# Patient Record
Sex: FEMALE | Race: WHITE | Employment: FULL TIME | URBAN - METROPOLITAN AREA
[De-identification: names, ages, dates, MRNs, and addresses within clinical notes are randomized per-mention and may not be internally consistent; named-entity substitution may affect disease eponyms.]

---

## 2017-02-07 ENCOUNTER — OFFICE VISIT (OUTPATIENT)
Dept: INTERNAL MEDICINE CLINIC | Age: 47
End: 2017-02-07

## 2017-02-07 VITALS
BODY MASS INDEX: 31.18 KG/M2 | WEIGHT: 194 LBS | HEART RATE: 68 BPM | RESPIRATION RATE: 16 BRPM | DIASTOLIC BLOOD PRESSURE: 88 MMHG | SYSTOLIC BLOOD PRESSURE: 142 MMHG | TEMPERATURE: 98.6 F | HEIGHT: 66 IN | OXYGEN SATURATION: 98 %

## 2017-02-07 DIAGNOSIS — Z98.890 S/P GASTRIC SURGERY: ICD-10-CM

## 2017-02-07 DIAGNOSIS — F10.11 HISTORY OF ALCOHOL ABUSE: ICD-10-CM

## 2017-02-07 DIAGNOSIS — F41.8 DEPRESSION WITH ANXIETY: ICD-10-CM

## 2017-02-07 DIAGNOSIS — E66.9 OBESITY (BMI 30.0-34.9): ICD-10-CM

## 2017-02-07 DIAGNOSIS — E03.9 ACQUIRED HYPOTHYROIDISM: Primary | ICD-10-CM

## 2017-02-07 NOTE — PROGRESS NOTES
Written by Nargis Borja, as dictated by Dr. Erich Greenfield MD.    Emily Azul is a 48 y.o. female. HPI  The patient comes in today to establish care. She has a Hx of depression. She has tried Wellbutrin and Effexor in the past. She is a recovering alcoholic, and she would like to follow with a counselor. Her BP is high today at 142/88 and she has been stressed. She works at [employer]. She is only taking levothyroxine and multivitamin. She denies any FMHx of diabetes. She does have cramping in her legs and seasonal allergies. She had a gastric bypass surgery 11 years ago and she was 300 lbs and she is now 194 lbs. She has not been taking B12. She was 175 lbs after the birth of her child, but she has gained her weight since her  went back to school and she has to work full time. She has not followed with an ob/gyn since she had been here. She has a 10 year IUD and she has had it for 6 years. She has not had a cycle with her IUD. She is concerned because she has a discharge with a slight odor, but she does not have any itching or burning. Patient Active Problem List   Diagnosis Code    Obesity (BMI 30.0-34. 9) E66.9    Depression F32.9    History of alcohol abuse Z87.898    S/P gastric surgery Z98.890        Current Outpatient Prescriptions on File Prior to Visit   Medication Sig Dispense Refill    levothyroxine (LEVOTHROID) 75 mcg tablet Take  by mouth Daily (before breakfast).  cholecalciferol (VITAMIN D3) 1,000 unit tablet Take  by mouth daily.  multivitamin, tx-iron-ca-min (THERA-M W/ IRON) 9 mg iron-400 mcg tab tablet Take 1 Tab by mouth daily.  docusate sodium (STOOL SOFTENER) 100 mg capsule Take 100 mg by mouth two (2) times a day. No current facility-administered medications on file prior to visit.         No Known Allergies    Past Medical History   Diagnosis Date    Alcohol abuse     Anemia     Depression  Hyperthyroidism 1/1/2012    Joint pain 1/1/2014    Stress     TIA (transient ischemic attack) 1978       Past Surgical History   Procedure Laterality Date    Hx gastric bypass  2001    Hx cholecystectomy         Family History   Problem Relation Age of Onset    Cancer Father     Hypertension Father     Diabetes Brother     Psychiatric Disorder Maternal Grandmother     Psychiatric Disorder Paternal Grandmother     Diabetes Paternal Grandfather        Social History     Social History    Marital status:      Spouse name: N/A    Number of children: N/A    Years of education: N/A     Occupational History    Not on file. Social History Main Topics    Smoking status: Never Smoker    Smokeless tobacco: Never Used    Alcohol use 0.0 oz/week     0 Standard drinks or equivalent per week    Drug use: No    Sexual activity: Yes     Partners: Male     Other Topics Concern    Not on file     Social History Narrative           Review of Systems   Constitutional: Negative for malaise/fatigue. HENT: Negative for congestion. Eyes: Negative for blurred vision and discharge. Respiratory: Negative for cough and wheezing. Cardiovascular: Negative for chest pain and palpitations. Gastrointestinal: Negative for abdominal pain and heartburn. Genitourinary: Negative for frequency and urgency. Musculoskeletal: Negative for joint pain and myalgias. Neurological: Negative for dizziness, tingling, sensory change, weakness and headaches. Psychiatric/Behavioral: Negative for depression and suicidal ideas. The patient is not nervous/anxious. Visit Vitals    /88 (BP 1 Location: Left arm, BP Patient Position: Sitting)    Pulse 68    Temp 98.6 °F (37 °C) (Oral)    Resp 16    Ht 5' 6\" (1.676 m)    Wt 194 lb (88 kg)    SpO2 98%    BMI 31.31 kg/m2     Physical Exam   Constitutional: She is oriented to person, place, and time. No distress.    Obese     HENT:   Right Ear: External ear normal.   Left Ear: External ear normal.   Mouth/Throat: Oropharynx is clear and moist.   Eyes: Conjunctivae and EOM are normal. Right eye exhibits no discharge. Left eye exhibits no discharge. Neck: Normal range of motion. Neck supple. Cardiovascular: Normal rate and regular rhythm. Pulmonary/Chest: Effort normal and breath sounds normal. She has no wheezes. Abdominal: Soft. Bowel sounds are normal. She exhibits no distension. Musculoskeletal:   BL knee crepitus    Lymphadenopathy:     She has no cervical adenopathy. Neurological: She is alert and oriented to person, place, and time. Reflex Scores:       Patellar reflexes are 2+ on the right side and 2+ on the left side. Skin: Skin is intact. Psychiatric: She has a normal mood and affect. Nursing note and vitals reviewed. ASSESSMENT and PLAN    ICD-10-CM ICD-9-CM    1. Acquired hypothyroidism E03.9 244.9 TSH REFLEX TO T4      CBC W/O DIFF      METABOLIC PANEL, COMPREHENSIVE      LIPID PANEL    We will check her levels today. 2. Depression with anxiety F41.8 300.4 naltrexone-buPROPion (CONTRAVE) 8-90 mg TbER ER tablet script given to patient. 3. History of alcohol abuse Z87.898 305.03   I want her to go straight on Contrave to help maintain her weight loss and since it has naltrexone then it can help with her Hx of alcohol abuse and depression. 4. S/P gastric surgery Z98.890 V45.89 VITAMIN B12 & FOLATE      VITAMIN D, 25 HYDROXY    I discussed that her B12 levels are probably low, so we will check it today. 5. Obesity (BMI 30.0-34. 9) E66.9 278.00 I discussed that the Contrave will help her maintain weight loss. This plan was reviewed with the patient and patient agrees. All questions were answered. This scribe documentation was reviewed by me and accurately reflects the examination and decisions made by me. This note will not be viewable in [address]

## 2017-02-07 NOTE — PROGRESS NOTES
Chief Complaint   Patient presents with    New Patient     labs and complete physical if possible, states that she has an iud and has chronic yeast infection.

## 2017-02-07 NOTE — MR AVS SNAPSHOT
Visit Information Date & Time Provider Department Dept. Phone Encounter #  
 2/7/2017 10:00 AM Magdaleno Killian, [address] [facility],[address] Internal Medicine [phone] [id number] Upcoming Health Maintenance Date Due  
 PAP AKA CERVICAL CYTOLOGY 10/2/1989 DTaP/Tdap/Td series (1 - Tdap) 1/2/2013 INFLUENZA AGE 9 TO ADULT 8/1/2016 Allergies as of 2/7/2017  Review Complete On: 2/7/2017 By: Magdaleno Killian MD  
 No Known Allergies Current Immunizations  Never Reviewed Name Date Td 1/1/2013 Not reviewed this visit You Were Diagnosed With   
  
 Codes Comments Acquired hypothyroidism    -  Primary ICD-10-CM: E03.9 ICD-9-CM: 244.9 Depression with anxiety     ICD-10-CM: F41.8 ICD-9-CM: 300.4 History of alcohol abuse     ICD-10-CM: Z87.898 ICD-9-CM: 305.03 S/P gastric surgery     ICD-10-CM: H48.779 ICD-9-CM: V45.89 Obesity (BMI 30.0-34.9)     ICD-10-CM: W14.3 ICD-9-CM: 278.00 Vitals BP Pulse Temp Resp Height(growth percentile) Weight(growth percentile) 142/88 (BP 1 Location: Left arm, BP Patient Position: Sitting) 68 98.6 °F (37 °C) (Oral) 16 5' 6\" (1.676 m) 194 lb (88 kg) SpO2 BMI OB Status Smoking Status 98% 31.31 kg/m2 IUD Never Smoker BMI and BSA Data Body Mass Index Body Surface Area  
 31.31 kg/m 2 2.02 m 2 Preferred Pharmacy Pharmacy Name Phone [address] [place] [address], [facility] [phone] Your Updated Medication List  
  
   
This list is accurate as of: 2/7/17 11:06 AM.  Always use your most recent med list.  
  
  
  
  
 LEVOTHROID 75 mcg tablet Generic drug:  levothyroxine Take  by mouth Daily (before breakfast). multivitamin, tx-iron-ca-min 9 mg iron-400 mcg Tab tablet Commonly known as:  THERA-M w/ IRON Take 1 Tab by mouth daily. naltrexone-buPROPion 8-90 mg Tber ER tablet Commonly known as:  Sheryl Bradley Week 1 1 tab PO QAM, Week 2 1QAM 1QHS, Week 3 2QAM 1 QHS, Week 4 & beyond 2QAM 2QHS STOOL SOFTENER 100 mg capsule Generic drug:  docusate sodium Take 100 mg by mouth two (2) times a day. VITAMIN D3 1,000 unit tablet Generic drug:  cholecalciferol Take  by mouth daily. Prescriptions Printed Refills  
 naltrexone-buPROPion (CONTRAVE) 8-90 mg TbER ER tablet 0 Sig: Week 1 1 tab PO QAM, Week 2 1QAM 1QHS, Week 3 2QAM 1 QHS, Week 4 & beyond 2QAM 2QHS Class: Print We Performed the Following CBC W/O DIFF [63148 CPT(R)] LIPID PANEL [84175 CPT(R)] METABOLIC PANEL, COMPREHENSIVE [10925 CPT(R)] TSH REFLEX TO T4 [[id number] Custom] VITAMIN B12 & FOLATE [23975 CPT(R)] VITAMIN D, 25 HYDROXY [id number] CPT(R)] Introducing [facility]! Hong Dhaliwal introduces Superprotonic patient portal. Now you can access parts of your medical record, email your doctor's office, and request medication refills online. 1. In your internet browser, go to [email or web address] 2. Click on the First Time User? Click Here link in the Sign In box. You will see the New Member Sign Up page. 3. Enter your Superprotonic Access Code exactly as it appears below. You will not need to use this code after youve completed the sign-up process. If you do not sign up before the expiration date, you must request a new code. · Superprotonic Access Code: [email or web address] Expires: 5/8/2017 11:00 AM 
 
4. Enter the last four digits of your Social Security Number (xxxx) and Date of Birth (mm/dd/yyyy) as indicated and click Submit. You will be taken to the next sign-up page. 5. Create a Reunifyt ID. This will be your Superprotonic login ID and cannot be changed, so think of one that is secure and easy to remember. 6. Create a Superprotonic password. You can change your password at any time. 7. Enter your Password Reset Question and Answer.  This can be used at a later time if you forget your password. 8. Enter your e-mail address. You will receive e-mail notification when new information is available in [address] 9. Click Sign Up. You can now view and download portions of your medical record. 10. Click the Download Summary menu link to download a portable copy of your medical information. If you have questions, please visit the Frequently Asked Questions section of the GameTube website. Remember, GameTube is NOT to be used for urgent needs. For medical emergencies, dial 911. Now available from your iPhone and Android! Please provide this summary of care documentation to your next provider. Your primary care clinician is listed as Ponce Luna. If you have any questions after today's visit, please call [phone].

## 2017-02-08 LAB
25(OH)D3+25(OH)D2 SERPL-MCNC: 22.3 NG/ML (ref 30–100)
ALBUMIN SERPL-MCNC: 4.4 G/DL (ref 3.5–5.5)
ALBUMIN/GLOB SERPL: 1.6 {RATIO} (ref 1.1–2.5)
ALP SERPL-CCNC: 59 IU/L (ref 39–117)
ALT SERPL-CCNC: 18 IU/L (ref 0–32)
AST SERPL-CCNC: 26 IU/L (ref 0–40)
BILIRUB SERPL-MCNC: 0.3 MG/DL (ref 0–1.2)
BUN SERPL-MCNC: 8 MG/DL (ref 6–24)
BUN/CREAT SERPL: 12 (ref 9–23)
CALCIUM SERPL-MCNC: 9.2 MG/DL (ref 8.7–10.2)
CHLORIDE SERPL-SCNC: 95 MMOL/L (ref 96–106)
CHOLEST SERPL-MCNC: 141 MG/DL (ref 100–199)
CO2 SERPL-SCNC: 26 MMOL/L (ref 18–29)
CREAT SERPL-MCNC: 0.69 MG/DL (ref 0.57–1)
ERYTHROCYTE [DISTWIDTH] IN BLOOD BY AUTOMATED COUNT: 12.8 % (ref 12.3–15.4)
FOLATE SERPL-MCNC: 11.7 NG/ML
GLOBULIN SER CALC-MCNC: 2.7 G/DL (ref 1.5–4.5)
GLUCOSE SERPL-MCNC: 84 MG/DL (ref 65–99)
HCT VFR BLD AUTO: 38 % (ref 34–46.6)
HDLC SERPL-MCNC: 64 MG/DL
HGB BLD-MCNC: 12.9 G/DL (ref 11.1–15.9)
INTERPRETATION, 910389: NORMAL
LDLC SERPL CALC-MCNC: 66 MG/DL (ref 0–99)
MCH RBC QN AUTO: 29.9 PG (ref 26.6–33)
MCHC RBC AUTO-ENTMCNC: 33.9 G/DL (ref 31.5–35.7)
MCV RBC AUTO: 88 FL (ref 79–97)
PLATELET # BLD AUTO: 237 X10E3/UL (ref 150–379)
POTASSIUM SERPL-SCNC: 3.9 MMOL/L (ref 3.5–5.2)
PROT SERPL-MCNC: 7.1 G/DL (ref 6–8.5)
RBC # BLD AUTO: 4.32 X10E6/UL (ref 3.77–5.28)
SODIUM SERPL-SCNC: 137 MMOL/L (ref 134–144)
TRIGL SERPL-MCNC: 55 MG/DL (ref 0–149)
TSH SERPL DL<=0.005 MIU/L-ACNC: 4.42 UIU/ML (ref 0.45–4.5)
VIT B12 SERPL-MCNC: 337 PG/ML (ref 211–946)
VLDLC SERPL CALC-MCNC: 11 MG/DL (ref 5–40)
WBC # BLD AUTO: 5.2 X10E3/UL (ref 3.4–10.8)

## 2017-02-08 NOTE — PROGRESS NOTES
pls let her know vitamin D is low, needs 800 I.U daily dose. Rest of the labs will be discuss during the visit.

## 2017-03-17 ENCOUNTER — OFFICE VISIT (OUTPATIENT)
Dept: INTERNAL MEDICINE CLINIC | Age: 47
End: 2017-03-17

## 2017-03-17 VITALS
SYSTOLIC BLOOD PRESSURE: 124 MMHG | RESPIRATION RATE: 16 BRPM | BODY MASS INDEX: 31.47 KG/M2 | OXYGEN SATURATION: 94 % | WEIGHT: 195.8 LBS | DIASTOLIC BLOOD PRESSURE: 80 MMHG | TEMPERATURE: 98.7 F | HEIGHT: 66 IN | HEART RATE: 64 BPM

## 2017-03-17 DIAGNOSIS — E66.9 OBESITY (BMI 30-39.9): ICD-10-CM

## 2017-03-17 DIAGNOSIS — E53.8 B12 DEFICIENCY: Primary | ICD-10-CM

## 2017-03-17 DIAGNOSIS — F41.9 ANXIETY: ICD-10-CM

## 2017-03-17 RX ORDER — CYANOCOBALAMIN 1000 UG/ML
1000 INJECTION, SOLUTION INTRAMUSCULAR; SUBCUTANEOUS ONCE
Qty: 1 ML | Refills: 0
Start: 2017-03-17 | End: 2017-03-17

## 2017-03-17 RX ORDER — PHENTERMINE HYDROCHLORIDE 37.5 MG/1
37.5 TABLET ORAL
Qty: 14 TAB | Refills: 0 | Status: SHIPPED | OUTPATIENT
Start: 2017-03-17 | End: 2017-03-31

## 2017-03-17 NOTE — PROGRESS NOTES
Written by Divya Smith, as dictated by Dr. Gabi Rivas MD.    Crow Luis is a 48 y.o. female. HPI  The patient comes in today for follow up. She started contrave and it was giving her nausea, and she has tried to eat with the medication and she is up one pound. She tried the phentermine in the past and she was able to lose some weight. The contrave has not helped with her depression at all, if any thing she thinks her depression has gotten worse. [place] is [place] [employer] [place] , and  she does not have any urges to drink at this time. She has started taking the Vitamin D, but she still has very little energy. Her B12 was also on the low side. She is having some seasonal allergies at this time but she is using a nasal spray and allegra. Patient Active Problem List   Diagnosis Code    Obesity (BMI 30.0-34. 9) E66.9    Depression F32.9    History of alcohol abuse Z87.898    S/P gastric surgery Z98.890        Current Outpatient Prescriptions on File Prior to Visit   Medication Sig Dispense Refill    levothyroxine (LEVOTHROID) 75 mcg tablet Take  by mouth Daily (before breakfast).  docusate sodium (STOOL SOFTENER) 100 mg capsule Take 100 mg by mouth two (2) times a day.  cholecalciferol (VITAMIN D3) 1,000 unit tablet Take  by mouth daily.  multivitamin, tx-iron-ca-min (THERA-M W/ IRON) 9 mg iron-400 mcg tab tablet Take 1 Tab by mouth daily. No current facility-administered medications on file prior to visit.         No Known Allergies    Past Medical History:   Diagnosis Date    Alcohol abuse     Anemia     Depression     Hyperthyroidism 1/1/2012    Joint pain 1/1/2014    Stress     TIA (transient ischemic attack) 1978       Past Surgical History:   Procedure Laterality Date    HX CHOLECYSTECTOMY      HX GASTRIC BYPASS  2001       Family History   Problem Relation Age of Onset    Cancer Father     Hypertension Father    [facility] Diabetes Brother     Psychiatric Disorder Maternal Grandmother     Psychiatric Disorder Paternal Grandmother     Diabetes Paternal Grandfather        Social History     Social History    Marital status:      Spouse name: N/A    Number of children: N/A    Years of education: N/A     Occupational History    Not on file.      Social History Main Topics    Smoking status: Never Smoker    Smokeless tobacco: Never Used    Alcohol use 0.0 oz/week     0 Standard drinks or equivalent per week    Drug use: No    Sexual activity: Yes     Partners: Male     Other Topics Concern    Not on file     Social History Narrative       Office Visit on 02/07/2017   Component Date Value Ref Range Status    TSH 02/07/2017 4.420  0.450 - 4.500 uIU/mL Final    WBC 02/07/2017 5.2  3.4 - 10.8 x10E3/uL Final    RBC 02/07/2017 4.32  3.77 - 5.28 x10E6/uL Final    HGB 02/07/2017 12.9  11.1 - 15.9 g/dL Final    HCT 02/07/2017 38.0  34.0 - 46.6 % Final    MCV 02/07/2017 88  79 - 97 fL Final    MCH 02/07/2017 29.9  26.6 - 33.0 pg Final    MCHC 02/07/2017 33.9  31.5 - 35.7 g/dL Final    RDW 02/07/2017 12.8  12.3 - 15.4 % Final    PLATELET 68/72/4843 375  150 - 379 x10E3/uL Final    Glucose 02/07/2017 84  65 - 99 mg/dL Final    BUN 02/07/2017 8  6 - 24 mg/dL Final    Creatinine 02/07/2017 0.69  0.57 - 1.00 mg/dL Final    GFR est non-AA 02/07/2017 103  >59 mL/min/1.73 Final    GFR est AA 02/07/2017 119  >59 mL/min/1.73 Final    BUN/Creatinine ratio 02/07/2017 12  9 - 23 Final    Sodium 02/07/2017 137  134 - 144 mmol/L Final    Potassium 02/07/2017 3.9  3.5 - 5.2 mmol/L Final    Chloride 02/07/2017 95* 96 - 106 mmol/L Final    CO2 02/07/2017 26  18 - 29 mmol/L Final    Calcium 02/07/2017 9.2  8.7 - 10.2 mg/dL Final    Protein, total 02/07/2017 7.1  6.0 - 8.5 g/dL Final    Albumin 02/07/2017 4.4  3.5 - 5.5 g/dL Final    GLOBULIN, TOTAL 02/07/2017 2.7  1.5 - 4.5 g/dL Final    A-G Ratio 02/07/2017 1.6  1.1 - 2.5 Final    Bilirubin, total 02/07/2017 0.3  0.0 - 1.2 mg/dL Final    Alk. phosphatase 02/07/2017 59  39 - 117 IU/L Final    AST (SGOT) 02/07/2017 26  0 - 40 IU/L Final    ALT (SGPT) 02/07/2017 18  0 - 32 IU/L Final    Cholesterol, total 02/07/2017 141  100 - 199 mg/dL Final    Triglyceride 02/07/2017 55  0 - 149 mg/dL Final    HDL Cholesterol 02/07/2017 64  >39 mg/dL Final    VLDL, calculated 02/07/2017 11  5 - 40 mg/dL Final    LDL, calculated 02/07/2017 66  0 - 99 mg/dL Final    Vitamin B12 02/07/2017 337  211 - 946 pg/mL Final    Folate 02/07/2017 11.7  >3.0 ng/mL Final    Comment: A serum folate concentration of less than 3.1 ng/mL is  considered to represent clinical deficiency.  VITAMIN D, 25-HYDROXY 02/07/2017 22.3* 30.0 - 100.0 ng/mL Final    Comment: Vitamin D deficiency has been defined by the [address] [place] [address] practice guideline as a  level of serum 25-OH vitamin D less than 20 ng/mL (1,2). The Endocrine Society went on to further define vitamin D  insufficiency as a level between 21 and 29 ng/mL (2). 1. IOM ([place] of [employer]). 2010. Dietary reference     intakes for calcium and D. [facility]: The     [employer]. 2. Zuly MF, Babar NC, Jesus DYER, et al.     Evaluation, treatment, and prevention of vitamin D     deficiency: an Endocrine Society clinical practice     guideline. JCEM. 2011 Jul; 96(7):1911-30.  INTERPRETATION 02/07/2017 Note   Final    Supplement report is available. Review of Systems   Constitutional: Negative for malaise/fatigue. HENT: Negative for congestion. Respiratory: Negative for cough and wheezing. Cardiovascular: Negative for chest pain and palpitations. Musculoskeletal: Negative for joint pain and myalgias. Neurological: Negative for weakness and headaches.      Visit Vitals    /80 (BP 1 Location: Right arm, BP Patient Position: Sitting)    Pulse 64    Temp 98.7 °F (37.1 °C) (Oral)    Resp 16    Ht 5' 6\" (1.676 m)    Wt 195 lb 12.8 oz (88.8 kg)    SpO2 94%    BMI 31.6 kg/m2     Physical Exam   Constitutional: She is oriented to person, place, and time. She appears well-nourished. No distress. HENT:   Right Ear: External ear normal.   Left Ear: External ear normal.   Mouth/Throat: Oropharynx is clear and moist.   Eyes: Conjunctivae and EOM are normal. Right eye exhibits no discharge. Left eye exhibits no discharge. Neck: Normal range of motion. Neck supple. Cardiovascular: Normal rate and regular rhythm. Pulmonary/Chest: Effort normal and breath sounds normal. She has no wheezes. Abdominal: Soft. Bowel sounds are normal. She exhibits no distension. Lymphadenopathy:     She has no cervical adenopathy. Neurological: She is alert and oriented to person, place, and time. Skin: Skin is intact. Psychiatric: She has a normal mood and affect. Nursing note and vitals reviewed. ASSESSMENT and PLAN    ICD-10-CM ICD-9-CM    1. B12 deficiency E53.8 266.2  B12 injection given today in the office. 2. Obesity (BMI 30-39. 9) E66.9 278.00 phentermine (ADIPEX-P) 37.5 mg tablet script given to patient. I will try Phentermine to help her lose weight and then if this helps then I will give her a month of this medication. 3. Anxiety F41.9 300.00 I discussed that it seems like her depression is tied with her weight gain, so then if the losing weight on phentermine helps her mood then we can wait to see how she is feeling before we start her on new  antidepressant. This plan was reviewed with the patient and patient agrees. All questions were answered. This scribe documentation was reviewed by me and accurately reflects the examination and decisions made by me. This note will not be viewable in [address]

## 2017-03-17 NOTE — PROGRESS NOTES
Chief Complaint   Patient presents with    Follow-up     follow up on medication and vitamin d, she is taking contrave and is having some gi nauseous. so she is eating to help with the nausea and has gained weight.

## 2017-03-17 NOTE — MR AVS SNAPSHOT
Visit Information Date & Time Provider Department Dept. Phone Encounter #  
 3/17/2017  8:45 AM Rodri Bowman MD [facility] Medicine [phone] [id number] Upcoming Health Maintenance Date Due  
 PAP AKA CERVICAL CYTOLOGY 10/2/1989 DTaP/Tdap/Td series (1 - Tdap) 1/2/2013 Allergies as of 3/17/2017  Review Complete On: 3/17/2017 By: Gerardo Martel LPN No Known Allergies Current Immunizations  Never Reviewed Name Date Td 1/1/2013 Not reviewed this visit You Were Diagnosed With   
  
 Codes Comments B12 deficiency    -  Primary ICD-10-CM: E53.8 ICD-9-CM: 266.2 Obesity (BMI 30-39. 9)     ICD-10-CM: E66.9 ICD-9-CM: 278.00 Anxiety     ICD-10-CM: F41.9 ICD-9-CM: 300.00 Vitals BP Pulse Temp Resp Height(growth percentile) Weight(growth percentile) 124/80 (BP 1 Location: Right arm, BP Patient Position: Sitting) 64 98.7 °F (37.1 °C) (Oral) 16 5' 6\" (1.676 m) 195 lb 12.8 oz (88.8 kg) SpO2 BMI OB Status Smoking Status 94% 31.6 kg/m2 IUD Never Smoker BMI and BSA Data Body Mass Index Body Surface Area  
 31.6 kg/m 2 2.03 m 2 Preferred Pharmacy Pharmacy Name Phone [address] [place] [address], [facility] [phone] Your Updated Medication List  
  
   
This list is accurate as of: 3/17/17  9:31 AM.  Always use your most recent med list.  
  
  
  
  
 LEVOTHROID 75 mcg tablet Generic drug:  levothyroxine Take  by mouth Daily (before breakfast). multivitamin, tx-iron-ca-min 9 mg iron-400 mcg Tab tablet Commonly known as:  THERA-M w/ IRON Take 1 Tab by mouth daily. phentermine 37.5 mg tablet Commonly known as:  ADIPEX-P Take 1 Tab by mouth every morning for 14 doses. Max Daily Amount: 37.5 mg. STOOL SOFTENER 100 mg capsule Generic drug:  docusate sodium Take 100 mg by mouth two (2) times a day. VITAMIN D3 1,000 unit tablet Generic drug:  cholecalciferol Take  by mouth daily. Prescriptions Printed Refills  
 phentermine (ADIPEX-P) 37.5 mg tablet 0 Sig: Take 1 Tab by mouth every morning for 14 doses. Max Daily Amount: 37.5 mg.  
 Class: Print Route: Oral  
  
Introducing [facility]! Dear Lindsey Borden: 
Thank you for requesting a Theatro account. Our records indicate that you already have an active Theatro account. You can access your account anytime at [email or web address] Did you know that you can access your hospital and ER discharge instructions at any time in Theatro? You can also review all of your test results from your hospital stay or ER visit. Additional Information If you have questions, please visit the Frequently Asked Questions section of the Theatro website at [email or web address] Remember, Theatro is NOT to be used for urgent needs. For medical emergencies, dial 911. Now available from your iPhone and Android! Please provide this summary of care documentation to your next provider. Your primary care clinician is listed as Bernardo Gibson. If you have any questions after today's visit, please call [phone].

## 2017-05-22 ENCOUNTER — OFFICE VISIT (OUTPATIENT)
Dept: INTERNAL MEDICINE CLINIC | Age: 47
End: 2017-05-22

## 2017-05-22 VITALS
DIASTOLIC BLOOD PRESSURE: 86 MMHG | BODY MASS INDEX: 31.79 KG/M2 | RESPIRATION RATE: 16 BRPM | HEIGHT: 66 IN | WEIGHT: 197.8 LBS | OXYGEN SATURATION: 98 % | SYSTOLIC BLOOD PRESSURE: 138 MMHG | HEART RATE: 64 BPM | TEMPERATURE: 98.4 F

## 2017-05-22 DIAGNOSIS — E03.9 ACQUIRED HYPOTHYROIDISM: Primary | ICD-10-CM

## 2017-05-22 DIAGNOSIS — E66.9 OBESITY (BMI 30.0-34.9): ICD-10-CM

## 2017-05-22 DIAGNOSIS — R51.9 DAILY HEADACHE: ICD-10-CM

## 2017-05-22 RX ORDER — PHENTERMINE HYDROCHLORIDE 37.5 MG/1
37.5 TABLET ORAL
Qty: 30 TAB | Refills: 0 | Status: SHIPPED | OUTPATIENT
Start: 2017-05-22 | End: 2017-06-21

## 2017-05-22 RX ORDER — LEVOTHYROXINE SODIUM 75 UG/1
TABLET ORAL
Status: CANCELLED | OUTPATIENT
Start: 2017-05-22

## 2017-05-22 RX ORDER — LEVOTHYROXINE SODIUM 100 UG/1
100 TABLET ORAL
Qty: 60 TAB | Refills: 0 | Status: SHIPPED | OUTPATIENT
Start: 2017-05-22 | End: 2017-07-20 | Stop reason: SDUPTHER

## 2017-05-22 RX ORDER — TOPIRAMATE 25 MG/1
25 TABLET ORAL 2 TIMES DAILY
Qty: 60 TAB | Refills: 0 | Status: SHIPPED | OUTPATIENT
Start: 2017-05-22 | End: 2017-06-15 | Stop reason: SDUPTHER

## 2017-05-22 NOTE — PROGRESS NOTES
Written by Natali Rockwell, as dictated by Dr. Ramiro Canela MD.    Bell Ramirez is a 48 y.o. female. HPI  The patient comes in today for a follow up. She weighs 197 lbs today. She took phentermine and had success with it and she took it for 2 weeks, but she could not make it back for her follow up. She would like to try the phentermine again and try medication to prevent daily headaches. She has also been having headaches recently. She is taking oral B12 and she has had one B12 injection. She has been taking synthroid 75 mcg and her TSH was 4.42, which is upper normal range. She is here to review her labs as well. She denies any irregular BMs. She has been having hot flashes recently. Patient Active Problem List   Diagnosis Code    Obesity (BMI 30.0-34. 9) E66.9    Depression F32.9    History of alcohol abuse Z87.898    S/P gastric surgery Z98.890        Current Outpatient Prescriptions on File Prior to Visit   Medication Sig Dispense Refill    docusate sodium (STOOL SOFTENER) 100 mg capsule Take 100 mg by mouth two (2) times a day.  cholecalciferol (VITAMIN D3) 1,000 unit tablet Take  by mouth daily.  multivitamin, tx-iron-ca-min (THERA-M W/ IRON) 9 mg iron-400 mcg tab tablet Take 1 Tab by mouth daily. No current facility-administered medications on file prior to visit.         No Known Allergies    Past Medical History:   Diagnosis Date    Alcohol abuse     Anemia     Depression     Hyperthyroidism 1/1/2012    Joint pain 1/1/2014    Stress     TIA (transient ischemic attack) 1978       Past Surgical History:   Procedure Laterality Date    HX CHOLECYSTECTOMY      HX GASTRIC BYPASS  2001       Family History   Problem Relation Age of Onset    Cancer Father     Hypertension Father     Diabetes Brother     Psychiatric Disorder Maternal Grandmother     Psychiatric Disorder Paternal Grandmother     Diabetes Paternal Grandfather Social History     Social History    Marital status:      Spouse name: N/A    Number of children: N/A    Years of education: N/A     Occupational History    Not on file. Social History Main Topics    Smoking status: Never Smoker    Smokeless tobacco: Never Used    Alcohol use 0.0 oz/week     0 Standard drinks or equivalent per week    Drug use: No    Sexual activity: Yes     Partners: Male     Other Topics Concern    Not on file     Social History Narrative           Review of Systems   Constitutional: Negative for malaise/fatigue. HENT: Negative for congestion. Respiratory: Negative for cough and wheezing. Cardiovascular: Negative for chest pain and palpitations. Musculoskeletal: Negative for joint pain and myalgias. Neurological: Positive for headaches. Negative for weakness. Psychiatric/Behavioral: Negative for depression and suicidal ideas. The patient is not nervous/anxious. Visit Vitals    /86 (BP 1 Location: Right arm, BP Patient Position: Sitting)    Pulse 64    Temp 98.4 °F (36.9 °C) (Oral)    Resp 16    Ht 5' 6\" (1.676 m)    Wt 197 lb 12.8 oz (89.7 kg)    SpO2 98%    BMI 31.93 kg/m2     Physical Exam   Constitutional: She is oriented to person, place, and time. No distress. Obese   HENT:   Right Ear: External ear normal.   Left Ear: External ear normal.   Mouth/Throat: Oropharynx is clear and moist.   Eyes: Conjunctivae and EOM are normal. Right eye exhibits no discharge. Left eye exhibits no discharge. Neck: Normal range of motion. Neck supple. Cardiovascular: Normal rate and regular rhythm. Pulmonary/Chest: Effort normal and breath sounds normal. She has no wheezes. Abdominal: Soft. Bowel sounds are normal. She exhibits no distension. Lymphadenopathy:     She has no cervical adenopathy. Neurological: She is alert and oriented to person, place, and time. Skin: Skin is intact. Psychiatric: She has a normal mood and affect. Nursing note and vitals reviewed. ASSESSMENT and PLAN    ICD-10-CM ICD-9-CM    1. Acquired hypothyroidism E03.9 244.9 levothyroxine (SYNTHROID) 100 mcg tablet sent to pharmacy    I will increase her synthroid to 100 mcg and we will recheck her levels in 6-8 weeks. 2. Daily headache R51 784.0 topiramate (TOPAMAX) 25 mg tablet sent to the pharmacy. I will start her on Topamax and I want her to take it once a day for 1 week and then if she does not have any side effects then she can increase it to BID. 3. Obesity (BMI 30.0-34. 9) E66.9 278.00 phentermine (ADIPEX-P) 37.5 mg tablet sent to the pharmacy. I will prescribe her phentermine for 1 month and I discussed that she has to come back after 1 month in order to do the proper regimen. Labs reviewed with her . This plan was reviewed with the patient and patient agrees. All questions were answered. This scribe documentation was reviewed by me and accurately reflects the examination and decisions made by me. This note will not be viewable in [address]

## 2017-05-22 NOTE — PROGRESS NOTES
Chief Complaint   Patient presents with    Follow-up     pateint states that she was given phentermine and was told if she needed something for depression to return. patient also states that she has had her ekg.

## 2017-05-22 NOTE — MR AVS SNAPSHOT
Visit Information Date & Time Provider Department Dept. Phone Encounter #  
 5/22/2017  3:30 PM Suzy Wick MD [facility] Internal Medicine [phone] [id number] Upcoming Health Maintenance Date Due  
 PAP AKA CERVICAL CYTOLOGY 10/2/1989 DTaP/Tdap/Td series (1 - Tdap) 1/2/2013 INFLUENZA AGE 9 TO ADULT 8/1/2017 Allergies as of 5/22/2017  Review Complete On: 5/22/2017 By: Edgar Alberto LPN No Known Allergies Current Immunizations  Never Reviewed Name Date Td 1/1/2013 Not reviewed this visit You Were Diagnosed With   
  
 Codes Comments Acquired hypothyroidism    -  Primary ICD-10-CM: E03.9 ICD-9-CM: 244.9 Daily headache     ICD-10-CM: R51 ICD-9-CM: 784.0 Obesity (BMI 30.0-34.9)     ICD-10-CM: Z11.3 ICD-9-CM: 278.00 Vitals BP Pulse Temp Resp Height(growth percentile) Weight(growth percentile) 138/86 (BP 1 Location: Right arm, BP Patient Position: Sitting) 64 98.4 °F (36.9 °C) (Oral) 16 5' 6\" (1.676 m) 197 lb 12.8 oz (89.7 kg) SpO2 BMI OB Status Smoking Status 98% 31.93 kg/m2 IUD Never Smoker BMI and BSA Data Body Mass Index Body Surface Area  
 31.93 kg/m 2 2.04 m 2 Preferred Pharmacy Pharmacy Name Phone [address] [place] [address], [facility] [phone] Your Updated Medication List  
  
   
This list is accurate as of: 5/22/17  4:07 PM.  Always use your most recent med list.  
  
  
  
  
 levothyroxine 100 mcg tablet Commonly known as:  SYNTHROID Take 1 Tab by mouth Daily (before breakfast) for 60 days. multivitamin, tx-iron-ca-min 9 mg iron-400 mcg Tab tablet Commonly known as:  THERA-M w/ IRON Take 1 Tab by mouth daily. phentermine 37.5 mg tablet Commonly known as:  ADIPEX-P Take 1 Tab by mouth every morning for 30 days. Max Daily Amount: 37.5 mg. STOOL SOFTENER 100 mg capsule Generic drug:  docusate sodium Take 100 mg by mouth two (2) times a day. topiramate 25 mg tablet Commonly known as:  TOPAMAX Take 1 Tab by mouth two (2) times a day for 30 days. VITAMIN D3 1,000 unit tablet Generic drug:  cholecalciferol Take  by mouth daily. Prescriptions Printed Refills  
 phentermine (ADIPEX-P) 37.5 mg tablet 0 Sig: Take 1 Tab by mouth every morning for 30 days. Max Daily Amount: 37.5 mg.  
 Class: Print Route: Oral  
  
Prescriptions Sent to Pharmacy Refills  
 levothyroxine (SYNTHROID) 100 mcg tablet 0 Sig: Take 1 Tab by mouth Daily (before breakfast) for 60 days. Class: Normal  
 [address] [facility]: [phone] Route: Oral  
 topiramate (TOPAMAX) 25 mg tablet 0 Sig: Take 1 Tab by mouth two (2) times a day for 30 days. Class: Normal  
 [address], [address] [facility] Ph #: [phone] Route: Oral  
  
Introducing [facility]! Dear Sae Jones: 
Thank you for requesting a POLYBONA account. Our records indicate that you already have an active POLYBONA account. You can access your account anytime at [email or web address]. [email or web address]/[facility] Did you know that you can access your hospital and ER discharge instructions at any time in POLYBONA? You can also review all of your test results from your hospital stay or ER visit. Additional Information If you have questions, please visit the Frequently Asked Questions section of the POLYBONA website at [email or web address]. [email or web address]/[facility]/. Remember, POLYBONA is NOT to be used for urgent needs. For medical emergencies, dial 911. Now available from your iPhone and Android! Please provide this summary of care documentation to your next provider. Your primary care clinician is listed as Lona Jenkins. If you have any questions after today's visit, please call [phone].

## 2017-06-15 DIAGNOSIS — R51.9 DAILY HEADACHE: ICD-10-CM

## 2017-06-15 RX ORDER — TOPIRAMATE 25 MG/1
TABLET ORAL
Qty: 60 TAB | Refills: 0 | Status: SHIPPED | OUTPATIENT
Start: 2017-06-15 | End: 2017-07-26 | Stop reason: SDUPTHER

## 2017-06-28 DIAGNOSIS — Z00.00 PHYSICAL EXAM: Primary | ICD-10-CM

## 2017-06-29 LAB
25(OH)D3+25(OH)D2 SERPL-MCNC: 24.8 NG/ML (ref 30–100)
ALBUMIN SERPL-MCNC: 4 G/DL (ref 3.5–5.5)
ALBUMIN/GLOB SERPL: 1.4 {RATIO} (ref 1.2–2.2)
ALP SERPL-CCNC: 75 IU/L (ref 39–117)
ALT SERPL-CCNC: 17 IU/L (ref 0–32)
AST SERPL-CCNC: 24 IU/L (ref 0–40)
BILIRUB SERPL-MCNC: 0.3 MG/DL (ref 0–1.2)
BUN SERPL-MCNC: 8 MG/DL (ref 6–24)
BUN/CREAT SERPL: 11 (ref 9–23)
CALCIUM SERPL-MCNC: 9 MG/DL (ref 8.7–10.2)
CHLORIDE SERPL-SCNC: 101 MMOL/L (ref 96–106)
CHOLEST SERPL-MCNC: 141 MG/DL (ref 100–199)
CO2 SERPL-SCNC: 23 MMOL/L (ref 18–29)
CREAT SERPL-MCNC: 0.71 MG/DL (ref 0.57–1)
ERYTHROCYTE [DISTWIDTH] IN BLOOD BY AUTOMATED COUNT: 13.8 % (ref 12.3–15.4)
FOLATE SERPL-MCNC: 8.2 NG/ML
GLOBULIN SER CALC-MCNC: 2.8 G/DL (ref 1.5–4.5)
GLUCOSE SERPL-MCNC: 74 MG/DL (ref 65–99)
HCT VFR BLD AUTO: 38.1 % (ref 34–46.6)
HDLC SERPL-MCNC: 54 MG/DL
HGB BLD-MCNC: 12.8 G/DL (ref 11.1–15.9)
INTERPRETATION, 910389: NORMAL
LDLC SERPL CALC-MCNC: 73 MG/DL (ref 0–99)
MCH RBC QN AUTO: 29.3 PG (ref 26.6–33)
MCHC RBC AUTO-ENTMCNC: 33.6 G/DL (ref 31.5–35.7)
MCV RBC AUTO: 87 FL (ref 79–97)
PLATELET # BLD AUTO: 240 X10E3/UL (ref 150–379)
POTASSIUM SERPL-SCNC: 4.3 MMOL/L (ref 3.5–5.2)
PROT SERPL-MCNC: 6.8 G/DL (ref 6–8.5)
RBC # BLD AUTO: 4.37 X10E6/UL (ref 3.77–5.28)
SODIUM SERPL-SCNC: 139 MMOL/L (ref 134–144)
TRIGL SERPL-MCNC: 72 MG/DL (ref 0–149)
TSH SERPL DL<=0.005 MIU/L-ACNC: 2.68 UIU/ML (ref 0.45–4.5)
VIT B12 SERPL-MCNC: 289 PG/ML (ref 211–946)
VLDLC SERPL CALC-MCNC: 14 MG/DL (ref 5–40)
WBC # BLD AUTO: 4.2 X10E3/UL (ref 3.4–10.8)

## 2017-06-30 ENCOUNTER — HOSPITAL ENCOUNTER (OUTPATIENT)
Dept: LAB | Age: 47
Discharge: HOME | End: 2017-06-30
Payer: COMMERCIAL

## 2017-06-30 ENCOUNTER — OFFICE VISIT (OUTPATIENT)
Dept: INTERNAL MEDICINE CLINIC | Age: 47
End: 2017-06-30

## 2017-06-30 VITALS
BODY MASS INDEX: 29.09 KG/M2 | SYSTOLIC BLOOD PRESSURE: 100 MMHG | HEART RATE: 65 BPM | OXYGEN SATURATION: 98 % | HEIGHT: 66 IN | WEIGHT: 181 LBS | TEMPERATURE: 98.4 F | DIASTOLIC BLOOD PRESSURE: 58 MMHG | RESPIRATION RATE: 16 BRPM

## 2017-06-30 DIAGNOSIS — Z12.39 BREAST CANCER SCREENING: ICD-10-CM

## 2017-06-30 DIAGNOSIS — Z01.419 WELL FEMALE EXAM WITH ROUTINE GYNECOLOGICAL EXAM: ICD-10-CM

## 2017-06-30 DIAGNOSIS — E53.8 B12 DEFICIENCY: Primary | ICD-10-CM

## 2017-06-30 DIAGNOSIS — F34.1 DYSTHYMIA: ICD-10-CM

## 2017-06-30 PROCEDURE — 87624 HPV HI-RISK TYP POOLED RSLT: CPT | Performed by: INTERNAL MEDICINE

## 2017-06-30 PROCEDURE — 88175 CYTOPATH C/V AUTO FLUID REDO: CPT | Performed by: INTERNAL MEDICINE

## 2017-06-30 RX ORDER — BUPROPION HYDROCHLORIDE 75 MG/1
75 TABLET ORAL 2 TIMES DAILY
Qty: 60 TAB | Refills: 0 | Status: SHIPPED | OUTPATIENT
Start: 2017-06-30 | End: 2017-07-26 | Stop reason: SDUPTHER

## 2017-06-30 RX ORDER — MAGNESIUM 200 MG
1000 TABLET ORAL DAILY
Qty: 90 TAB | Refills: 0 | Status: SHIPPED | OUTPATIENT
Start: 2017-06-30 | End: 2017-09-28

## 2017-06-30 RX ORDER — CYANOCOBALAMIN 1000 UG/ML
1000 INJECTION, SOLUTION INTRAMUSCULAR; SUBCUTANEOUS ONCE
Qty: 1 ML | Refills: 0
Start: 2017-06-30 | End: 2017-06-30

## 2017-06-30 NOTE — PROGRESS NOTES
Written by Dakota Monge, as dictated by Dr. Julieta Poole MD.    Iliana Hamilton is a 48 y.o. female. HPI  The patient comes in today for a complete physical examination and pap. Her last pap smear was 2-3 years ago, which was normal. She is still feeling fatigued. She is compliant on topamax and has found it has helped her. She has lost weight since her last visit, from 197 lbs in 05/2017 to 181 lbs today. She has lost 21 lbs total since 07/2015, when she was 202 lbs. Her B12 was low in 02/2017 at 337. She has gotten one injection since then and has not taken oral B12 supplements, and her B12 has decreased further to 289. She is compliant on vitamin D supplements and takes 2000 iu daily and her vitamin D levels have increased from 22.3 to 24.8. She is compliant on synthroid. She denies headaches. She has not been taking phentermine for 2 weeks and has found she can control her portions, and has been successfully losing weight. She denies any anxiety but has been feeling low lately. Her last mammogram was in 01/2014 but she has been checking her breasts at home. She has an IUD placed and had some bleeding a few weeks ago for the first time in the 7 years she has had it, which she thinks is due to hormonal changes. She denies palpitations while she was on phentermine, or constipation. She has had some vaginal discharge but denies itching. Patient Active Problem List   Diagnosis Code    Obesity (BMI 30.0-34. 9) E66.9    History of alcohol abuse Z87.898    S/P gastric surgery Z98.890    Dysthymia F34.1        Current Outpatient Prescriptions on File Prior to Visit   Medication Sig Dispense Refill    topiramate (TOPAMAX) 25 mg tablet take 1 tablet by mouth twice a day 60 Tab 0    levothyroxine (SYNTHROID) 100 mcg tablet Take 1 Tab by mouth Daily (before breakfast) for 60 days.  60 Tab 0    docusate sodium (STOOL SOFTENER) 100 mg capsule Take 100 mg by mouth two (2) times a day.  cholecalciferol (VITAMIN D3) 1,000 unit tablet Take  by mouth daily.  multivitamin, tx-iron-ca-min (THERA-M W/ IRON) 9 mg iron-400 mcg tab tablet Take 1 Tab by mouth daily. No current facility-administered medications on file prior to visit. No Known Allergies    Past Medical History:   Diagnosis Date    Alcohol abuse     Anemia     Depression     Hyperthyroidism 1/1/2012    Joint pain 1/1/2014    Stress     TIA (transient ischemic attack) 1978       Past Surgical History:   Procedure Laterality Date    HX CHOLECYSTECTOMY      HX GASTRIC BYPASS  2001       Family History   Problem Relation Age of Onset    Cancer Father     Hypertension Father     Diabetes Brother     Psychiatric Disorder Maternal Grandmother     Psychiatric Disorder Paternal Grandmother     Diabetes Paternal Grandfather        Social History     Social History    Marital status:      Spouse name: N/A    Number of children: N/A    Years of education: N/A     Occupational History    Not on file.      Social History Main Topics    Smoking status: Never Smoker    Smokeless tobacco: Never Used    Alcohol use 0.0 oz/week     0 Standard drinks or equivalent per week    Drug use: No    Sexual activity: Yes     Partners: Male     Other Topics Concern    Not on file     Social History Narrative       Orders Only on 06/28/2017   Component Date Value Ref Range Status    WBC 06/28/2017 4.2  3.4 - 10.8 x10E3/uL Final    RBC 06/28/2017 4.37  3.77 - 5.28 x10E6/uL Final    HGB 06/28/2017 12.8  11.1 - 15.9 g/dL Final    HCT 06/28/2017 38.1  34.0 - 46.6 % Final    MCV 06/28/2017 87  79 - 97 fL Final    MCH 06/28/2017 29.3  26.6 - 33.0 pg Final    MCHC 06/28/2017 33.6  31.5 - 35.7 g/dL Final    RDW 06/28/2017 13.8  12.3 - 15.4 % Final    PLATELET 35/87/8452 825  150 - 379 x10E3/uL Final    Glucose 06/28/2017 74  65 - 99 mg/dL Final    BUN 06/28/2017 8  6 - 24 mg/dL Final    Creatinine 06/28/2017 0.71  0.57 - 1.00 mg/dL Final    GFR est non-AA 06/28/2017 101  >59 mL/min/1.73 Final    GFR est AA 06/28/2017 116  >59 mL/min/1.73 Final    BUN/Creatinine ratio 06/28/2017 11  9 - 23 Final    Sodium 06/28/2017 139  134 - 144 mmol/L Final    Potassium 06/28/2017 4.3  3.5 - 5.2 mmol/L Final    Chloride 06/28/2017 101  96 - 106 mmol/L Final    CO2 06/28/2017 23  18 - 29 mmol/L Final    Calcium 06/28/2017 9.0  8.7 - 10.2 mg/dL Final    Protein, total 06/28/2017 6.8  6.0 - 8.5 g/dL Final    Albumin 06/28/2017 4.0  3.5 - 5.5 g/dL Final    GLOBULIN, TOTAL 06/28/2017 2.8  1.5 - 4.5 g/dL Final    A-G Ratio 06/28/2017 1.4  1.2 - 2.2 Final    Bilirubin, total 06/28/2017 0.3  0.0 - 1.2 mg/dL Final    Alk. phosphatase 06/28/2017 75  39 - 117 IU/L Final    AST (SGOT) 06/28/2017 24  0 - 40 IU/L Final    ALT (SGPT) 06/28/2017 17  0 - 32 IU/L Final    Cholesterol, total 06/28/2017 141  100 - 199 mg/dL Final    Triglyceride 06/28/2017 72  0 - 149 mg/dL Final    HDL Cholesterol 06/28/2017 54  >39 mg/dL Final    VLDL, calculated 06/28/2017 14  5 - 40 mg/dL Final    LDL, calculated 06/28/2017 73  0 - 99 mg/dL Final    TSH 06/28/2017 2.680  0.450 - 4.500 uIU/mL Final    Vitamin B12 06/28/2017 289  211 - 946 pg/mL Final    Folate 06/28/2017 8.2  >3.0 ng/mL Final    Comment: A serum folate concentration of less than 3.1 ng/mL is  considered to represent clinical deficiency.  VITAMIN D, 25-HYDROXY 06/28/2017 24.8* 30.0 - 100.0 ng/mL Final    Comment: Vitamin D deficiency has been defined by the [facility] practice guideline as a  level of serum 25-OH vitamin D less than 20 ng/mL (1,2). The Endocrine Society went on to further define vitamin D  insufficiency as a level between 21 and 29 ng/mL (2). 1. IOM ([place] of Medicine). 2010. Dietary reference     intakes for calcium and D. [facility]: The     [facility].   2. Zuly BENTLEY, Babar ABLARRAN, Jesus DYER et al.     Evaluation, treatment, and prevention of vitamin D     deficiency: an Endocrine Society clinical practice     guideline. JCEM. 2011 Jul; 96(7):1911-30.  INTERPRETATION 06/28/2017 Note   Final    Supplement report is available. Review of Systems   Constitutional: Negative for malaise/fatigue. HENT: Negative for congestion. Eyes: Negative for blurred vision and pain. Respiratory: Negative for cough and shortness of breath. Cardiovascular: Negative for chest pain and palpitations. Gastrointestinal: Negative for abdominal pain and heartburn. Genitourinary: Negative for frequency and urgency. Musculoskeletal: Negative for joint pain and myalgias. Neurological: Negative for dizziness, tingling, sensory change, weakness and headaches. Psychiatric/Behavioral: Negative for depression, memory loss and substance abuse. Visit Vitals    /58 (BP 1 Location: Right arm, BP Patient Position: Sitting)    Pulse 65    Temp 98.4 °F (36.9 °C) (Oral)    Resp 16    Ht 5' 6\" (1.676 m)    Wt 181 lb (82.1 kg)    LMP 06/16/2017 (Approximate)    SpO2 98%    BMI 29.21 kg/m2       Physical Exam   Constitutional: She is oriented to person, place, and time. She appears well-developed and well-nourished. No distress. HENT:   Right Ear: External ear normal.   Left Ear: External ear normal.   Eyes: Conjunctivae and EOM are normal. Right eye exhibits no discharge. Left eye exhibits no discharge. Neck: Normal range of motion. Neck supple. Cardiovascular: Normal rate and regular rhythm. Pulses:       Dorsalis pedis pulses are 2+ on the right side, and 2+ on the left side. Pulmonary/Chest: Effort normal and breath sounds normal. She has no wheezes. Abdominal: Soft. Bowel sounds are normal. There is no tenderness. Genitourinary: Vaginal discharge found. Lymphadenopathy:     She has no cervical adenopathy.    Neurological: She is alert and oriented to person, place, and time. Skin: She is not diaphoretic. Psychiatric: She has a normal mood and affect. Her behavior is normal.   Nursing note and vitals reviewed. ASSESSMENT and PLAN    ICD-10-CM ICD-9-CM    1. B12 deficiency E53.8 266.2 cyanocobalamin (VITAMIN B-12) 1,000 mcg sublingual tablet    I discussed that after her B12 injection today, I want her to get a B12 injection every two weeks 3 times. I also want her to take oral B12 supplements. 2. Dysthymia F34.1 300.4 buPROPion (WELLBUTRIN) 75 mg tablet    I want her to try Wellbutrin once a day for a week, and if there are no adverse side effects she can increase to BID. 3. Well female exam with routine gynecological exam Z01.419 V72.31 PAP IG, APTIMA HPV AND RFX 16/18,45 ([id number])    Pap smear performed in office. Pt tolerated this procedure well. 4. Breast cancer screening Z12.39 V76.10 [facility] BI SCREENING INCL CAD    I want her to call and schedule a mammogram.       This plan was reviewed with the patient and patient agrees. All questions were answered. This scribe documentation was reviewed by me and accurately reflects the examination and decisions made by me.

## 2017-06-30 NOTE — MR AVS SNAPSHOT
Visit Information Date & Time Provider Department Dept. Phone Encounter #  
 6/30/2017  8:30 AM Margarita Gutierrez MD [facility] Internal Medicine [phone] [id number] Upcoming Health Maintenance Date Due INFLUENZA AGE 9 TO ADULT 8/1/2017 PAP AKA CERVICAL CYTOLOGY 6/30/2020 DTaP/Tdap/Td series (2 - Td) 6/26/2024 Allergies as of 6/30/2017  Review Complete On: 6/30/2017 By: Dmitri Singh No Known Allergies Current Immunizations  Never Reviewed Name Date Td 1/1/2013 Not reviewed this visit You Were Diagnosed With   
  
 Codes Comments B12 deficiency    -  Primary ICD-10-CM: E53.8 ICD-9-CM: 266.2 Dysthymia     ICD-10-CM: F34.1 ICD-9-CM: 300.4 Well female exam with routine gynecological exam     ICD-10-CM: H39.467 ICD-9-CM: V72.31 Breast cancer screening     ICD-10-CM: Z12.39 
ICD-9-CM: V76.10 Vitals BP Pulse Temp Resp Height(growth percentile) Weight(growth percentile) 100/58 (BP 1 Location: Right arm, BP Patient Position: Sitting) 65 98.4 °F (36.9 °C) (Oral) 16 5' 6\" (1.676 m) 181 lb (82.1 kg) LMP SpO2 BMI OB Status Smoking Status 06/16/2017 (Approximate) 98% 29.21 kg/m2 IUD Never Smoker Vitals History BMI and BSA Data Body Mass Index Body Surface Area  
 29.21 kg/m 2 1.96 m 2 Preferred Pharmacy Pharmacy Name Phone [address] [place] [address], [facility] [phone] Your Updated Medication List  
  
   
This list is accurate as of: 6/30/17  9:34 AM.  Always use your most recent med list.  
  
  
  
  
 buPROPion 75 mg tablet Commonly known as:  [facility] Take 1 Tab by mouth two (2) times a day for 30 days. * cyanocobalamin 1,000 mcg sublingual tablet Commonly known as:  VITAMIN B-12 Take 1 Tab by mouth daily for 90 days. * cyanocobalamin 1,000 mcg/mL injection Commonly known as:  VITAMIN B-12  
 1 mL by IntraMUSCular route once for 1 dose. levothyroxine 100 mcg tablet Commonly known as:  SYNTHROID Take 1 Tab by mouth Daily (before breakfast) for 60 days. MIRENA 20 mcg/24 hr (5 years) IUD Generic drug:  levonorgestrel 1 Each by IntraUTERine route once. Due for removal 2018  
  
 multivitamin, tx-iron-ca-min 9 mg iron-400 mcg Tab tablet Commonly known as:  THERA-M w/ IRON Take 1 Tab by mouth daily. STOOL SOFTENER 100 mg capsule Generic drug:  docusate sodium Take 100 mg by mouth two (2) times a day. topiramate 25 mg tablet Commonly known as:  TOPAMAX  
take 1 tablet by mouth twice a day VITAMIN D3 1,000 unit tablet Generic drug:  cholecalciferol Take  by mouth daily. * Notice: This list has 2 medication(s) that are the same as other medications prescribed for you. Read the directions carefully, and ask your doctor or other care provider to review them with you. Prescriptions Sent to Pharmacy Refills  
 cyanocobalamin (VITAMIN B-12) 1,000 mcg sublingual tablet 0 Sig: Take 1 Tab by mouth daily for 90 days. Class: Normal  
 Pharmacy: [address] , [address] [facility] Ph #: [phone] Route: Oral  
 buPROPion (WELLBUTRIN) 75 mg tablet 0 Sig: Take 1 Tab by mouth two (2) times a day for 30 days. Class: Normal  
 [address] Ph #: [phone] Route: Oral  
  
We Performed the Following PAP IG, APTIMA HPV AND RFX 16/18,45 ([id number]) [[id number] Custom] THER/PROPH/DIAG INJECTION, SUBCUT/IM [id number] CPT(R)] VITAMIN B12 INJECTION [ [facility]] To-Do List   
 06/30/2017 Imaging:  [facility] MAMMO BI SCREENING INCL CAD Introducing \[facility] [place] [facility]\"" & HEALTH SERVICES! Dear Naya Edwards: 
Thank you for requesting a BizSlate account. Our records indicate that you already have an active BizSlate account.   You can access your account anytime at [email or web address] Did you know that you can access your hospital and ER discharge instructions at any time in Oonair? You can also review all of your test results from your hospital stay or ER visit. Additional Information If you have questions, please visit the Frequently Asked Questions section of the Oonair website at [email or web address] Remember, Oonair is NOT to be used for urgent needs. For medical emergencies, dial 911. Now available from your iPhone and Android! Please provide this summary of care documentation to your next provider. Your primary care clinician is listed as Joaquina Lozano. If you have any questions after today's visit, please call [phone].

## 2017-06-30 NOTE — PROGRESS NOTES
1. Have you been to the ER, urgent care clinic since your last visit? Hospitalized since your last visit? No    2. Have you seen or consulted any other health care providers outside of the [address] [place] [address] since your last visit? Include any pap smears or colon screening. No    Chief Complaint   Patient presents with    Complete Physical     pap smear       Not fasting. Has had TDaP in last 10yrs- 2013.

## 2017-07-11 DIAGNOSIS — R68.82 LIBIDO, DECREASED: Primary | ICD-10-CM

## 2017-07-20 DIAGNOSIS — E03.9 ACQUIRED HYPOTHYROIDISM: ICD-10-CM

## 2017-07-20 RX ORDER — LEVOTHYROXINE SODIUM 100 UG/1
TABLET ORAL
Qty: 60 TAB | Refills: 0 | Status: SHIPPED | OUTPATIENT
Start: 2017-07-20 | End: 2017-09-24 | Stop reason: SDUPTHER

## 2017-07-26 DIAGNOSIS — F34.1 DYSTHYMIA: ICD-10-CM

## 2017-07-26 DIAGNOSIS — R51.9 DAILY HEADACHE: ICD-10-CM

## 2017-07-26 RX ORDER — BUPROPION HYDROCHLORIDE 75 MG/1
TABLET ORAL
Qty: 60 TAB | Refills: 0 | Status: SHIPPED | OUTPATIENT
Start: 2017-07-26 | End: 2017-09-03 | Stop reason: SDUPTHER

## 2017-07-26 RX ORDER — TOPIRAMATE 25 MG/1
TABLET ORAL
Qty: 60 TAB | Refills: 0 | Status: SHIPPED | OUTPATIENT
Start: 2017-07-26 | End: 2017-08-28 | Stop reason: SDUPTHER

## 2017-08-28 DIAGNOSIS — R51.9 DAILY HEADACHE: ICD-10-CM

## 2017-08-29 RX ORDER — TOPIRAMATE 25 MG/1
TABLET ORAL
Qty: 60 TAB | Refills: 0 | Status: SHIPPED | OUTPATIENT
Start: 2017-08-29 | End: 2017-10-09 | Stop reason: SDUPTHER

## 2017-09-03 DIAGNOSIS — F34.1 DYSTHYMIA: ICD-10-CM

## 2017-09-04 RX ORDER — BUPROPION HYDROCHLORIDE 75 MG/1
TABLET ORAL
Qty: 60 TAB | Refills: 0 | Status: SHIPPED | OUTPATIENT
Start: 2017-09-04 | End: 2017-10-13 | Stop reason: SDUPTHER

## 2017-10-13 DIAGNOSIS — F34.1 DYSTHYMIA: ICD-10-CM

## 2017-10-13 RX ORDER — BUPROPION HYDROCHLORIDE 75 MG/1
TABLET ORAL
Qty: 60 TAB | Refills: 0 | Status: SHIPPED | OUTPATIENT
Start: 2017-10-13 | End: 2017-11-23 | Stop reason: SDUPTHER

## 2018-01-30 DIAGNOSIS — E03.9 ACQUIRED HYPOTHYROIDISM: ICD-10-CM

## 2018-01-30 RX ORDER — LEVOTHYROXINE SODIUM 100 UG/1
TABLET ORAL
Qty: 60 TAB | Refills: 1 | Status: SHIPPED | OUTPATIENT
Start: 2018-01-30

## 2018-03-22 DIAGNOSIS — R51.9 DAILY HEADACHE: ICD-10-CM

## 2018-03-22 RX ORDER — TOPIRAMATE 25 MG/1
TABLET ORAL
Qty: 60 TAB | Refills: 2 | Status: SHIPPED | OUTPATIENT
Start: 2018-03-22

## 2022-03-18 PROBLEM — Z98.890 S/P GASTRIC SURGERY: Status: ACTIVE | Noted: 2017-02-07

## 2022-03-19 PROBLEM — F34.1 DYSTHYMIA: Status: ACTIVE | Noted: 2017-06-30

## 2022-03-20 PROBLEM — F10.11 HISTORY OF ALCOHOL ABUSE: Status: ACTIVE | Noted: 2017-02-07

## 2023-05-25 RX ORDER — PSEUDOEPHEDRINE HCL 30 MG
100 TABLET ORAL 2 TIMES DAILY
COMMUNITY

## 2023-05-25 RX ORDER — TOPIRAMATE 25 MG/1
1 TABLET ORAL 2 TIMES DAILY
COMMUNITY
Start: 2018-03-22

## 2023-05-25 RX ORDER — LEVOTHYROXINE SODIUM 0.1 MG/1
TABLET ORAL
COMMUNITY
Start: 2018-01-30